# Patient Record
Sex: MALE | Race: WHITE | NOT HISPANIC OR LATINO | Employment: UNEMPLOYED | URBAN - METROPOLITAN AREA
[De-identification: names, ages, dates, MRNs, and addresses within clinical notes are randomized per-mention and may not be internally consistent; named-entity substitution may affect disease eponyms.]

---

## 2024-12-22 ENCOUNTER — APPOINTMENT (OUTPATIENT)
Dept: GENERAL RADIOLOGY | Facility: HOSPITAL | Age: 44
End: 2024-12-22
Payer: MEDICAID

## 2024-12-22 ENCOUNTER — HOSPITAL ENCOUNTER (EMERGENCY)
Facility: HOSPITAL | Age: 44
Discharge: HOME | End: 2024-12-22
Attending: EMERGENCY MEDICINE | Admitting: EMERGENCY MEDICINE
Payer: MEDICAID

## 2024-12-22 VITALS
WEIGHT: 260 LBS | SYSTOLIC BLOOD PRESSURE: 153 MMHG | OXYGEN SATURATION: 95 % | HEART RATE: 68 BPM | HEIGHT: 71 IN | DIASTOLIC BLOOD PRESSURE: 87 MMHG | BODY MASS INDEX: 36.4 KG/M2 | TEMPERATURE: 98.4 F | RESPIRATION RATE: 18 BRPM

## 2024-12-22 DIAGNOSIS — Z78.9 ALCOHOL USE: ICD-10-CM

## 2024-12-22 DIAGNOSIS — S16.1XXA STRAIN OF NECK MUSCLE, INITIAL ENCOUNTER: ICD-10-CM

## 2024-12-22 DIAGNOSIS — M54.2 NECK PAIN: Primary | ICD-10-CM

## 2024-12-22 LAB
ALBUMIN SERPL-MCNC: 4 G/DL (ref 3.5–5.2)
ALBUMIN/GLOB SERPL: 1.4 G/DL
ALP SERPL-CCNC: 109 U/L (ref 39–117)
ALT SERPL W P-5'-P-CCNC: 14 U/L (ref 1–41)
ANION GAP SERPL CALCULATED.3IONS-SCNC: 9 MMOL/L (ref 5–15)
AST SERPL-CCNC: 22 U/L (ref 1–40)
BASOPHILS # BLD AUTO: 0.07 10*3/MM3 (ref 0–0.2)
BASOPHILS NFR BLD AUTO: 0.7 % (ref 0–1.5)
BILIRUB SERPL-MCNC: 0.3 MG/DL (ref 0–1.2)
BUN SERPL-MCNC: 19 MG/DL (ref 6–20)
BUN/CREAT SERPL: 18.4 (ref 7–25)
CALCIUM SPEC-SCNC: 8.6 MG/DL (ref 8.6–10.5)
CHLORIDE SERPL-SCNC: 104 MMOL/L (ref 98–107)
CO2 SERPL-SCNC: 28 MMOL/L (ref 22–29)
CREAT SERPL-MCNC: 1.03 MG/DL (ref 0.76–1.27)
DEPRECATED RDW RBC AUTO: 45.2 FL (ref 37–54)
EGFRCR SERPLBLD CKD-EPI 2021: 90.7 ML/MIN/1.73
EOSINOPHIL # BLD AUTO: 0.15 10*3/MM3 (ref 0–0.4)
EOSINOPHIL NFR BLD AUTO: 1.5 % (ref 0.3–6.2)
ERYTHROCYTE [DISTWIDTH] IN BLOOD BY AUTOMATED COUNT: 13.4 % (ref 12.3–15.4)
ETHANOL BLD-MCNC: 224 MG/DL (ref 0–10)
GEN 5 1HR TROPONIN T REFLEX: 7 NG/L
GLOBULIN UR ELPH-MCNC: 2.8 GM/DL
GLUCOSE SERPL-MCNC: 89 MG/DL (ref 65–99)
HCT VFR BLD AUTO: 42.9 % (ref 37.5–51)
HGB BLD-MCNC: 14.3 G/DL (ref 13–17.7)
HOLD SPECIMEN: NORMAL
IMM GRANULOCYTES # BLD AUTO: 0.02 10*3/MM3 (ref 0–0.05)
IMM GRANULOCYTES NFR BLD AUTO: 0.2 % (ref 0–0.5)
LYMPHOCYTES # BLD AUTO: 3.01 10*3/MM3 (ref 0.7–3.1)
LYMPHOCYTES NFR BLD AUTO: 30 % (ref 19.6–45.3)
MCH RBC QN AUTO: 30.6 PG (ref 26.6–33)
MCHC RBC AUTO-ENTMCNC: 33.3 G/DL (ref 31.5–35.7)
MCV RBC AUTO: 91.9 FL (ref 79–97)
MONOCYTES # BLD AUTO: 0.74 10*3/MM3 (ref 0.1–0.9)
MONOCYTES NFR BLD AUTO: 7.4 % (ref 5–12)
NEUTROPHILS NFR BLD AUTO: 6.06 10*3/MM3 (ref 1.7–7)
NEUTROPHILS NFR BLD AUTO: 60.2 % (ref 42.7–76)
NRBC BLD AUTO-RTO: 0 /100 WBC (ref 0–0.2)
PLATELET # BLD AUTO: 258 10*3/MM3 (ref 140–450)
PMV BLD AUTO: 9.6 FL (ref 6–12)
POTASSIUM SERPL-SCNC: 4.7 MMOL/L (ref 3.5–5.2)
PROT SERPL-MCNC: 6.8 G/DL (ref 6–8.5)
RBC # BLD AUTO: 4.67 10*6/MM3 (ref 4.14–5.8)
SODIUM SERPL-SCNC: 141 MMOL/L (ref 136–145)
TROPONIN T NUMERIC DELTA: 0 NG/L
TROPONIN T SERPL HS-MCNC: 7 NG/L
WBC NRBC COR # BLD AUTO: 10.05 10*3/MM3 (ref 3.4–10.8)
WHOLE BLOOD HOLD COAG: NORMAL
WHOLE BLOOD HOLD SPECIMEN: NORMAL

## 2024-12-22 PROCEDURE — 36415 COLL VENOUS BLD VENIPUNCTURE: CPT

## 2024-12-22 PROCEDURE — 82077 ASSAY SPEC XCP UR&BREATH IA: CPT | Performed by: EMERGENCY MEDICINE

## 2024-12-22 PROCEDURE — 80053 COMPREHEN METABOLIC PANEL: CPT | Performed by: EMERGENCY MEDICINE

## 2024-12-22 PROCEDURE — 85025 COMPLETE CBC W/AUTO DIFF WBC: CPT | Performed by: EMERGENCY MEDICINE

## 2024-12-22 PROCEDURE — 99284 EMERGENCY DEPT VISIT MOD MDM: CPT

## 2024-12-22 PROCEDURE — 25010000002 KETOROLAC TROMETHAMINE PER 15 MG: Performed by: EMERGENCY MEDICINE

## 2024-12-22 PROCEDURE — 71045 X-RAY EXAM CHEST 1 VIEW: CPT

## 2024-12-22 PROCEDURE — 93005 ELECTROCARDIOGRAM TRACING: CPT | Performed by: EMERGENCY MEDICINE

## 2024-12-22 PROCEDURE — 72040 X-RAY EXAM NECK SPINE 2-3 VW: CPT

## 2024-12-22 PROCEDURE — 96375 TX/PRO/DX INJ NEW DRUG ADDON: CPT

## 2024-12-22 PROCEDURE — 96374 THER/PROPH/DIAG INJ IV PUSH: CPT

## 2024-12-22 PROCEDURE — 84484 ASSAY OF TROPONIN QUANT: CPT | Performed by: EMERGENCY MEDICINE

## 2024-12-22 PROCEDURE — 25010000002 DIAZEPAM PER 5 MG: Performed by: EMERGENCY MEDICINE

## 2024-12-22 RX ORDER — SODIUM CHLORIDE 0.9 % (FLUSH) 0.9 %
10 SYRINGE (ML) INJECTION AS NEEDED
Status: DISCONTINUED | OUTPATIENT
Start: 2024-12-22 | End: 2024-12-22 | Stop reason: HOSPADM

## 2024-12-22 RX ORDER — KETOROLAC TROMETHAMINE 15 MG/ML
15 INJECTION, SOLUTION INTRAMUSCULAR; INTRAVENOUS ONCE
Status: COMPLETED | OUTPATIENT
Start: 2024-12-22 | End: 2024-12-22

## 2024-12-22 RX ORDER — CYCLOBENZAPRINE HCL 10 MG
10 TABLET ORAL 3 TIMES DAILY PRN
Qty: 12 TABLET | Refills: 0 | Status: SHIPPED | OUTPATIENT
Start: 2024-12-22

## 2024-12-22 RX ORDER — DIAZEPAM 10 MG/2ML
5 INJECTION, SOLUTION INTRAMUSCULAR; INTRAVENOUS ONCE
Status: COMPLETED | OUTPATIENT
Start: 2024-12-22 | End: 2024-12-22

## 2024-12-22 RX ORDER — KETOROLAC TROMETHAMINE 15 MG/ML
15 INJECTION, SOLUTION INTRAMUSCULAR; INTRAVENOUS ONCE
Status: DISCONTINUED | OUTPATIENT
Start: 2024-12-22 | End: 2024-12-22

## 2024-12-22 RX ORDER — IBUPROFEN 600 MG/1
600 TABLET, FILM COATED ORAL 3 TIMES DAILY
Qty: 12 TABLET | Refills: 0 | Status: SHIPPED | OUTPATIENT
Start: 2024-12-22

## 2024-12-22 RX ADMIN — DIAZEPAM 5 MG: 10 INJECTION, SOLUTION INTRAMUSCULAR; INTRAVENOUS at 17:07

## 2024-12-22 RX ADMIN — KETOROLAC TROMETHAMINE 15 MG: 15 INJECTION, SOLUTION INTRAMUSCULAR; INTRAVENOUS at 17:06

## 2024-12-22 NOTE — ED PROVIDER NOTES
Subjective   History of Present Illness  Patient is a 44-year-old male who presents to the emergency department with complaints of upper back, neck, and chest discomfort.  States that the pain began in his upper back 2 weeks ago.  Transition to his neck and occasionally has pains in his right anterior chest.  The neck discomfort is worse with palpation of the area.  Range of motion of the neck is remained intact.  Also has intermittent right-sided chest discomfort.  Chest discomfort very brief and lasting only couple seconds.        Review of Systems   All other systems reviewed and are negative.      No past medical history on file.    Allergies   Allergen Reactions    Compazine [Prochlorperazine] Delirium    Darvon [Propoxyphene] Rash       No past surgical history on file.    No family history on file.    Social History     Socioeconomic History    Marital status:            Objective   Physical Exam  Vitals and nursing note reviewed.   Constitutional:       General: He is not in acute distress.  HENT:      Head: Normocephalic and atraumatic.      Mouth/Throat:      Mouth: Mucous membranes are moist.   Eyes:      Extraocular Movements: Extraocular movements intact.      Conjunctiva/sclera: Conjunctivae normal.      Pupils: Pupils are equal, round, and reactive to light.   Neck:      Thyroid: No thyromegaly.      Comments: Diffuse tenderness to palpation throughout the C spine and upper T spine, though approx T3.  Cardiovascular:      Rate and Rhythm: Normal rate and regular rhythm.      Heart sounds: Normal heart sounds. No murmur heard.     No friction rub. No gallop.   Pulmonary:      Effort: Pulmonary effort is normal. No respiratory distress.      Breath sounds: Normal breath sounds.   Abdominal:      General: Bowel sounds are normal.      Palpations: Abdomen is soft.      Tenderness: There is no abdominal tenderness.      Comments: No anterior chest wall tenderness to palpation   Musculoskeletal:          General: Normal range of motion.      Cervical back: Normal range of motion and neck supple. Tenderness present. No rigidity.   Lymphadenopathy:      Cervical: No cervical adenopathy.   Skin:     General: Skin is warm and dry.      Capillary Refill: Capillary refill takes less than 2 seconds.   Neurological:      General: No focal deficit present.      Mental Status: He is alert and oriented to person, place, and time.   Psychiatric:         Behavior: Behavior normal.         Thought Content: Thought content normal.         Procedures           ED Course         Latest Reference Range & Units 12/22/24 17:01   HS Troponin T <22 ng/L 7   Sodium 136 - 145 mmol/L 141   Potassium 3.5 - 5.2 mmol/L 4.7   Chloride 98 - 107 mmol/L 104   CO2 22.0 - 29.0 mmol/L 28.0   Anion Gap 5.0 - 15.0 mmol/L 9.0   BUN 6 - 20 mg/dL 19   Creatinine 0.76 - 1.27 mg/dL 1.03   BUN/Creatinine Ratio 7.0 - 25.0  18.4   eGFR >60.0 mL/min/1.73 90.7   Glucose 65 - 99 mg/dL 89   Calcium 8.6 - 10.5 mg/dL 8.6   Alkaline Phosphatase 39 - 117 U/L 109   Total Protein 6.0 - 8.5 g/dL 6.8   Albumin 3.5 - 5.2 g/dL 4.0   Globulin gm/dL 2.8   A/G Ratio g/dL 1.4   AST (SGOT) 1 - 40 U/L 22   ALT (SGPT) 1 - 41 U/L 14   Total Bilirubin 0.0 - 1.2 mg/dL 0.3   WBC 3.40 - 10.80 10*3/mm3 10.05   RBC 4.14 - 5.80 10*6/mm3 4.67   Hemoglobin 13.0 - 17.7 g/dL 14.3   Hematocrit 37.5 - 51.0 % 42.9   Platelets 140 - 450 10*3/mm3 258   RDW 12.3 - 15.4 % 13.4   MCV 79.0 - 97.0 fL 91.9   MCH 26.6 - 33.0 pg 30.6   MCHC 31.5 - 35.7 g/dL 33.3   MPV 6.0 - 12.0 fL 9.6   RDW-SD 37.0 - 54.0 fl 45.2   Neutrophil Rel % 42.7 - 76.0 % 60.2   Lymphocyte Rel % 19.6 - 45.3 % 30.0   Monocyte Rel % 5.0 - 12.0 % 7.4   Eosinophil Rel % 0.3 - 6.2 % 1.5   Basophil Rel % 0.0 - 1.5 % 0.7   Immature Granulocyte Rel % 0.0 - 0.5 % 0.2   Neutrophils Absolute 1.70 - 7.00 10*3/mm3 6.06   Lymphocytes Absolute 0.70 - 3.10 10*3/mm3 3.01   Monocytes Absolute 0.10 - 0.90 10*3/mm3 0.74   Eosinophils Absolute  0.00 - 0.40 10*3/mm3 0.15   Basophils Absolute 0.00 - 0.20 10*3/mm3 0.07   Immature Grans, Absolute 0.00 - 0.05 10*3/mm3 0.02   nRBC 0.0 - 0.2 /100 WBC 0.0   Ethanol 0 - 10 mg/dL 224 (H)   (H): Data is abnormally high    XR Chest 1 View   Final Result   Impression:   No acute cardiopulmonary process.         Electronically Signed: Zahra Beltran MD     12/22/2024 5:34 PM EST     Workstation ID: [id number]      XR Spine Cervical 2 View   Final Result   Impression:      1. Degenerative disc disease at C6/7 level. No acute bony abnormality.         Electronically Signed: Alexander Dow MD     12/22/2024 5:31 PM EST     Workstation ID: [id number]        Vitals:    12/22/24 1728 12/22/24 1758 12/22/24 1828 12/22/24 1832   BP: 154/85 131/79 153/87 153/87   BP Location:       Patient Position:       Pulse: 66 74 66 68   Resp:       Temp:       TempSrc:       SpO2: 94% 92% 96% 95%   Weight:       Height:         Medications   diazePAM (VALIUM) injection 5 mg (5 mg Intravenous Given 12/22/24 1707)   ketorolac (TORADOL) injection 15 mg (15 mg Intravenous Given 12/22/24 1706)     ECG/EMG Results (last 24 hours)       ** No results found for the last 24 hours. **          ECG 12 Lead Chest Pain   Final Result   Test Reason : CP   Blood Pressure :   */*   mmHG   Vent. Rate :  67 BPM     Atrial Rate :  67 BPM      P-R Int : 146 ms          QRS Dur :  98 ms       QT Int : 442 ms       P-R-T Axes :  30  35  45 degrees     QTcB Int : 467 ms      Normal sinus rhythm   Normal ECG   When compared with ECG of 22-Dec-2024 16:37, (Unconfirmed)   No significant change was found   Confirmed by MD KINCAID CORY [id number]) on 12/24/2024 12:29:33 PM      Referred By: EDMD           Confirmed By: OCTAVIO KINCAID MD      ECG 12 Lead Chest Pain   Final Result   Test Reason : CP   Blood Pressure :   */*   mmHG   Vent. Rate :  60 BPM     Atrial Rate :  60 BPM      P-R Int : 140 ms          QRS Dur : 102 ms       QT Int : 452 ms       P-R-T Axes :  16  41  44  degrees     QTcB Int : 452 ms      Normal sinus rhythm   Normal ECG   No previous ECGs available   Confirmed by MD KINCAID CORY [id number]) on 12/24/2024 12:30:51 PM      Referred By: EDMD           Confirmed By: OCTAVIO KINCAID MD                                                         Medical Decision Making  Pt feels dramatically better following treatment.  He understands to monitor symptoms and have a low threshold to return to the ED if symptoms persist, worsen, or other concerns arise.    Problems Addressed:  Alcohol use: complicated acute illness or injury  Neck pain: complicated acute illness or injury  Strain of neck muscle, initial encounter: complicated acute illness or injury    Amount and/or Complexity of Data Reviewed  External Data Reviewed: notes.  Labs: ordered. Decision-making details documented in ED Course.  Radiology: ordered and independent interpretation performed. Decision-making details documented in ED Course.  ECG/medicine tests: ordered and independent interpretation performed. Decision-making details documented in ED Course.    Risk  Prescription drug management.        Final diagnoses:   Neck pain   Strain of neck muscle, initial encounter   Alcohol use       ED Disposition  ED Disposition       ED Disposition   Discharge    Condition   Stable    Comment   --           DISCHARGE    Patient discharged in stable condition.    Reviewed implications of results, diagnosis, meds, responsibility to follow up, warning signs and symptoms of possible worsening, potential complications and reasons to return to ER.    Patient/Family voiced understanding of above instructions.    Discussed plan for discharge, as there is no emergent indication for admission.  Pt/family is agreeable and understands need for follow up and possible repeat testing.  Pt/family is aware that discharge does not mean that nothing is wrong but that it indicates no emergency is currently present that requires admission and they must  continue care with follow-up as given below or with a physician of their choice.     FOLLOW-UP  PATIENT CONNECTION - [place] [id number]  [phone]  Schedule an appointment as soon as possible for a visit       [facility] EMERGENCY DEPARTMENT  [address]  [facility] [address] [phone]    If symptoms worsen         Medication List        New Prescriptions      cyclobenzaprine 10 MG tablet  Commonly known as: FLEXERIL  Take 1 tablet by mouth 3 (Three) Times a Day As Needed for Muscle Spasms.     ibuprofen 600 MG tablet  Commonly known as: ADVIL,MOTRIN  Take 1 tablet by mouth 3 (Three) Times a Day.               Where to Get Your Medications        These medications were sent to [id number] - [place], [place] - [address]  AT [address] [facility] - [phone] [facility] [phone]   [address] [place] [address]      Phone: [phone]   cyclobenzaprine 10 MG tablet  ibuprofen 600 MG tablet            Freddie William DO  12/25/24 0232

## 2024-12-24 LAB
QT INTERVAL: 442 MS
QT INTERVAL: 452 MS
QTC INTERVAL: 452 MS
QTC INTERVAL: 467 MS